# Patient Record
Sex: FEMALE | Race: OTHER | Employment: UNEMPLOYED | URBAN - METROPOLITAN AREA
[De-identification: names, ages, dates, MRNs, and addresses within clinical notes are randomized per-mention and may not be internally consistent; named-entity substitution may affect disease eponyms.]

---

## 2018-07-11 ENCOUNTER — HOSPITAL ENCOUNTER (EMERGENCY)
Age: 33
Discharge: HOME | End: 2018-07-11
Attending: OBSTETRICS & GYNECOLOGY | Admitting: OBSTETRICS & GYNECOLOGY
Payer: MEDICAID

## 2018-07-11 VITALS
WEIGHT: 240 LBS | DIASTOLIC BLOOD PRESSURE: 76 MMHG | SYSTOLIC BLOOD PRESSURE: 128 MMHG | TEMPERATURE: 98.6 F | HEIGHT: 61 IN | RESPIRATION RATE: 18 BRPM | HEART RATE: 92 BPM | BODY MASS INDEX: 45.31 KG/M2

## 2018-07-11 PROBLEM — R10.9 ABDOMINAL PAIN DURING PREGNANCY, THIRD TRIMESTER: Status: ACTIVE | Noted: 2018-07-11

## 2018-07-11 PROBLEM — O26.893 ABDOMINAL PAIN DURING PREGNANCY, THIRD TRIMESTER: Status: ACTIVE | Noted: 2018-07-11

## 2018-07-11 PROCEDURE — 74011250636 HC RX REV CODE- 250/636: Performed by: OBSTETRICS & GYNECOLOGY

## 2018-07-11 PROCEDURE — 99284 EMERGENCY DEPT VISIT MOD MDM: CPT

## 2018-07-11 PROCEDURE — 59025 FETAL NON-STRESS TEST: CPT

## 2018-07-11 PROCEDURE — 96372 THER/PROPH/DIAG INJ SC/IM: CPT

## 2018-07-11 PROCEDURE — 96360 HYDRATION IV INFUSION INIT: CPT

## 2018-07-11 PROCEDURE — 74011250636 HC RX REV CODE- 250/636

## 2018-07-11 RX ORDER — TERBUTALINE SULFATE 1 MG/ML
0.25 INJECTION SUBCUTANEOUS
Status: COMPLETED | OUTPATIENT
Start: 2018-07-11 | End: 2018-07-11

## 2018-07-11 RX ORDER — SODIUM CHLORIDE, SODIUM LACTATE, POTASSIUM CHLORIDE, CALCIUM CHLORIDE 600; 310; 30; 20 MG/100ML; MG/100ML; MG/100ML; MG/100ML
999 INJECTION, SOLUTION INTRAVENOUS CONTINUOUS
Status: DISCONTINUED | OUTPATIENT
Start: 2018-07-11 | End: 2018-07-12 | Stop reason: HOSPADM

## 2018-07-11 RX ORDER — TERBUTALINE SULFATE 1 MG/ML
INJECTION SUBCUTANEOUS
Status: COMPLETED
Start: 2018-07-11 | End: 2018-07-11

## 2018-07-11 RX ADMIN — TERBUTALINE SULFATE 0.25 MG: 1 INJECTION SUBCUTANEOUS at 19:51

## 2018-07-11 RX ADMIN — SODIUM CHLORIDE, SODIUM LACTATE, POTASSIUM CHLORIDE, AND CALCIUM CHLORIDE 999 ML/HR: 600; 310; 30; 20 INJECTION, SOLUTION INTRAVENOUS at 19:50

## 2018-07-11 NOTE — IP AVS SNAPSHOT
Summary of Care Report The Summary of Care report has been created to help improve care coordination. Users with access to [employer] or [address] (Web-based application) may access additional patient information including the Discharge Summary. If you are not currently a [address] user and need more information, please call the number listed below in the [place] [address] section and ask to be connected with Medical Records. Facility Information Name Address Phone [id number] [place] [address] [address] [place] [id number] [phone] Patient Information Patient Name Sex  Bienvenido Chavez ([id number]) Female 1985 Discharge Information Admitting Provider Service Area Unit Cindy Gilman MD / [facility] [address] [place] / [phone] Discharge Provider Discharge Date/Time Discharge Disposition Destination (none) 2018 20:48 (Pending) AHR (none) Patient Language Language ENGLISH [13] Hospital Problems as of 2018  Never Reviewed Class Noted - Resolved Last Modified POA Active Problems Abdominal pain during pregnancy, third trimester  2018 - Present 2018 by Cindy Gilman MD Unknown Entered by Cindy Gilman MD  
  
You are allergic to the following No active allergies Current Discharge Medication List  
  
ASK your doctor about these medications Dose & Instructions Dispensing Information Comments EAMON (PF) 275 mg/1.1 mL Atin Generic drug:  hydroxyprogest(PF)(preg presv) by SubCUTAneous route. Refills:  0  
   
 RIGHT STEP PRENATAL VITAMINS 27 mg iron- 0.8 mg Tab tablet Generic drug:  prenatal vit-iron fumarate-fa Dose:  1 Tab Take 1 Tab by mouth daily. Refills:  0 Follow-up Information Follow up With Details Comments Contact Info Provider Unknown   Patient not available to ask Discharge Instructions Early Stage of Labor at Home: Care Instructions Your Care Instructions If you came to the hospital while in early labor, your doctor may have asked if you want to labor at home until your contractions are stronger. Many women stay at home during early labor. This is often the longest part of the birthing process. It may last up to 2 to 3 days. Contractions are mild to moderate and shorter (about 30 to 45 seconds). You can usually keep talking during them. Contractions may also be irregular, about 5 to 20 minutes apart. They may even stop for a while. It helps to stay as relaxed as you can during this time. You can spend some or all of your early labor at home or anywhere else you may be comfortable. If you live far from the hospital or birthing center, you may want to think about going somewhere nearby so you can get back to the hospital quickly. For some women, there may be benefits to staying home during early labor, such as avoiding medicines or procedures. As labor progresses, you'll shift from early labor to active labor. During this time, contractions get more intense. They occur more often, about every 2 to 3 minutes. They also last longer, about 50 to 70 seconds. You will feel them even when you change positions and walk or move around. It may be hard to tell if you are in active labor. If you aren't sure, call your doctor or midwife. As your labor progresses, check in with your doctor or midwife about when to come back to the hospital or birthing center. You may have special instructions if your water broke or you tested positive for group B strep. Follow-up care is a key part of your treatment and safety. Be sure to make and go to all appointments, and call your doctor if you are having problems. It's also a good idea to know your test results and keep a list of the medicines you take. How can you care for yourself at home? · Get support. Having a support person with you from early labor until after childbirth can have a positive effect on childbirth. · Find distractions. During early labor, you can walk, play cards, watch TV, or listen to music to help take your mind off your contractions. · Ask your partner, labor , or  for a massage. Shoulder and low back massage during contractions may ease your pain. Strong massage of the back muscles (counterpressure) during contractions may help relieve the pain of back labor. Tell your labor  exactly where to push and how hard to push. · Use imagery. This means using your imagination to decrease your pain. For instance, to help manage pain, picture your contractions as waves rolling over you. Picture a peaceful place, such as a beach or mountain stream, to help you relax between contractions. · Change positions during labor. Walking, kneeling, or sitting on a big rubber ball (birth ball) are good options. · Use focused breathing techniques. Breathing in a rhythm can distract you from pain. · Take a warm shower or bath. Warm water may ease pain and stress. When should you call for help? Call 911 anytime you think you may need emergency care. For example, call if: 
  · You passed out (lost consciousness).  
  · You have severe vaginal bleeding.  
  · You have severe pain in your belly or pelvis.  
  · You have had fluid gushing or leaking from your vagina and you know or think the umbilical cord is bulging into your vagina. If this happens, immediately get down on your knees so your rear end (buttocks) is higher than your head. This will decrease the pressure on the cord until help arrives.  
[facility] your doctor now or seek immediate medical care if: 
  · You have new or worse signs of preeclampsia, such as: 
¨ Sudden swelling of your face, hands, or feet. ¨ New vision problems (such as dimness or blurring). ¨ A severe headache.   · You have any vaginal bleeding.  
  · You have belly pain or cramping.  
  · You have a fever.  
  · You have had regular contractions (with or without pain) for an hour. This means that you have 8 or more within 1 hour or 4 or more in 20 minutes after you change your position and drink fluids.  
  · You have a sudden release of fluid from your vagina.  
  · You have low back pain or pelvic pressure that does not go away.  
  · You notice that your baby has stopped moving or is moving much less than normal.  
 Watch closely for changes in your health, and be sure to contact your doctor if you have any problems. Where can you learn more? Go to http://elvis-berlin.info/. Enter X698 in the search box to learn more about \"Early Stage of Labor at Home: Care Instructions. \" Current as of: November 21, 2017 Content Version: 11.7 © 7122-3401 [employer]. Care instructions adapted under license by [employer] (which disclaims liability or warranty for this information). If you have questions about a medical condition or this instruction, always ask your healthcare professional. [address] any warranty or liability for your use of this information. Chart Review Routing History No Routing History on File

## 2018-07-11 NOTE — ED PROVIDER NOTES
Chief Complaint:      27 y.o. female at 35w1d  weeks gestation who is seen for mild abdominal pain. Pelvic pressure, loose bowels. Moved here 3 wks ago from [place], trying to establish [facility]. Taking weekly [place] inj for previous 2nd trimester loss. Due for inj today has it at home. HISTORY:    History   Sexual Activity    Sexual activity: Yes    Partners: Male     No LMP recorded. Patient is pregnant. Social History     Social History    Marital status: SINGLE     Spouse name: N/A    Number of children: N/A    Years of education: N/A     Occupational History    Not on file. Social History Main Topics    Smoking status: Never Smoker    Smokeless tobacco: Not on file    Alcohol use No    Drug use: No    Sexual activity: Yes     Partners: Male     Other Topics Concern    Not on file     Social History Narrative       Past Surgical History:   Procedure Laterality Date    HX GYN          HX OTHER SURGICAL      pyloric stenosis surgery as a child       Past Medical History:   Diagnosis Date    Kidney disease          ROS:  A 12 point review of symptoms negative except for chief complaint as described above. PHYSICAL EXAM:  Blood pressure 128/76, pulse 92, temperature 98.6 °F (37 °C), resp. rate 18, height 5' 1\" (1.549 m), weight 108.9 kg (240 lb). Constitutional: The patient appears well, alert, oriented x 3. Cardiovascular: Heart RRR, no murmurs.    Respiratory: Lungs clear, no respiratory distress  GI: Abdomen soft, nontender, no guarding  No fundal tenderness  Musculoskeletal: no cva tenderness  Upper ext: no edema, reflexes +2  Lower ext: no edema, neg ahmet's, reflexes +2  Skin: no rashes or lesions  Psychiatric:Mood/ Affect: appropriate  Genitourinary: SVE: /ballottable  FHT: 130's cat 1  TOCO: minimal uterine irritability    I personally reviewed pt's medical record including relevant labs and ultrasounds    Assessment/Plan: pelvic pressure/abd pain, no evidence of labor.  Continue w Belén, follow up in office ASAP. Addendum:  Pt began w increasing frequency and intensity of contractions prior to discharge. Cx rechecked after 1 hr, changed to 2/50/-2. Will attempt to stop ctx w IV fluids and terb, allow labor if no improvement. Given terbutaline 0.2 mg SQ and 1 liter LR, ctx much improved. Cx rechecked w no change. Pt comfortable w discharge home, will return if sx return. Questions answered. NA

## 2018-07-11 NOTE — IP AVS SNAPSHOT
[address] [place]  
[phone] Patient: Apoorva Magana MRN: [id number] QQC:3/52/7244 A check angelique indicates which time of day the medication should be taken. My Medications ASK your doctor about these medications Instructions Each Dose to Equal  
 Morning Noon Evening Bedtime EAMON (PF) 275 mg/1.1 mL Atin Generic drug:  hydroxyprogest(PF)(preg presv) Your last dose was: Your next dose is:    
   
   
 by SubCUTAneous route. RIGHT STEP PRENATAL VITAMINS 27 mg iron- 0.8 mg Tab tablet Generic drug:  prenatal vit-iron fumarate-fa Your last dose was: Your next dose is: Take 1 Tab by mouth daily. 1 Tab

## 2018-07-11 NOTE — PROGRESS NOTES
Regular contractions now noted with EFM vs irritability initially. Reactive FHR  Dr Alejandra Birch notified. Orders received to watch pt for one hour and MD will recheck cervix. Plan updated to pt.

## 2018-07-11 NOTE — IP AVS SNAPSHOT
[address] [place] [address] Viraj Martinez  
[phone] Patient: Huber Cruz MRN: [id number] TBG:[id number] About your hospitalization You were admitted on:  N/A You last received care in the:  [facility] You were discharged on:  July 11, 2018 Why you were hospitalized Your primary diagnosis was:  Not on File Your diagnoses also included:  Abdominal Pain During Pregnancy, Third Trimester Follow-up Information Follow up With Details Comments Contact Info Provider Unknown   Patient not available to ask Discharge Orders None A check angelique indicates which time of day the medication should be taken. My Medications ASK your doctor about these medications Instructions Each Dose to Equal  
 Morning Noon Evening Bedtime EAMON (PF) 275 mg/1.1 mL Atin Generic drug:  hydroxyprogest(PF)(preg presv) Your last dose was: Your next dose is:    
   
   
 by SubCUTAneous route. RIGHT STEP PRENATAL VITAMINS 27 mg iron- 0.8 mg Tab tablet Generic drug:  prenatal vit-iron fumarate-fa Your last dose was: Your next dose is: Take 1 Tab by mouth daily. 1 Tab Discharge Instructions Early Stage of Labor at Home: Care Instructions Your Care Instructions If you came to the hospital while in early labor, your doctor may have asked if you want to labor at home until your contractions are stronger. Many women stay at home during early labor. This is often the longest part of the birthing process. It may last up to 2 to 3 days. Contractions are mild to moderate and shorter (about 30 to 45 seconds). You can usually keep talking during them. Contractions may also be irregular, about 5 to 20 minutes apart. They may even stop for a while. It helps to stay as relaxed as you can during this time.  You can spend some or all of your early labor at home or anywhere else you may be comfortable. If you live far from the hospital or birthing center, you may want to think about going somewhere nearby so you can get back to the hospital quickly. For some women, there may be benefits to staying home during early labor, such as avoiding medicines or procedures. As labor progresses, you'll shift from early labor to active labor. During this time, contractions get more intense. They occur more often, about every 2 to 3 minutes. They also last longer, about 50 to 70 seconds. You will feel them even when you change positions and walk or move around. It may be hard to tell if you are in active labor. If you aren't sure, call your doctor or midwife. As your labor progresses, check in with your doctor or midwife about when to come back to the hospital or birthing center. You may have special instructions if your water broke or you tested positive for group B strep. Follow-up care is a key part of your treatment and safety. Be sure to make and go to all appointments, and call your doctor if you are having problems. It's also a good idea to know your test results and keep a list of the medicines you take. How can you care for yourself at home? · Get support. Having a support person with you from early labor until after childbirth can have a positive effect on childbirth. · Find distractions. During early labor, you can walk, play cards, watch TV, or listen to music to help take your mind off your contractions. · Ask your partner, labor , or  for a massage. Shoulder and low back massage during contractions may ease your pain. Strong massage of the back muscles (counterpressure) during contractions may help relieve the pain of back labor. Tell your labor  exactly where to push and how hard to push. · Use imagery. This means using your imagination to decrease your pain. For instance, to help manage pain, picture your contractions as waves rolling over you. Picture a peaceful place, such as a beach or mountain stream, to help you relax between contractions. · Change positions during labor. Walking, kneeling, or sitting on a big rubber ball (birth ball) are good options. · Use focused breathing techniques. Breathing in a rhythm can distract you from pain. · Take a warm shower or bath. Warm water may ease pain and stress. When should you call for help? Call 911 anytime you think you may need emergency care. For example, call if: 
  · You passed out (lost consciousness).  
  · You have severe vaginal bleeding.  
  · You have severe pain in your belly or pelvis.  
  · You have had fluid gushing or leaking from your vagina and you know or think the umbilical cord is bulging into your vagina. If this happens, immediately get down on your knees so your rear end (buttocks) is higher than your head. This will decrease the pressure on the cord until help arrives.  
[facility] your doctor now or seek immediate medical care if: 
  · You have new or worse signs of preeclampsia, such as: 
¨ Sudden swelling of your face, hands, or feet. ¨ New vision problems (such as dimness or blurring). ¨ A severe headache.  
  · You have any vaginal bleeding.  
  · You have belly pain or cramping.  
  · You have a fever.  
  · You have had regular contractions (with or without pain) for an hour. This means that you have 8 or more within 1 hour or 4 or more in 20 minutes after you change your position and drink fluids.  
  · You have a sudden release of fluid from your vagina.  
  · You have low back pain or pelvic pressure that does not go away.  
  · You notice that your baby has stopped moving or is moving much less than normal.  
 Watch closely for changes in your health, and be sure to contact your doctor if you have any problems. Where can you learn more? Go to http://elvis-berlin.info/. Enter J549 in the search box to learn more about \"Early Stage of Labor at Home: Care Instructions. \" Current as of: November 21, 2017 Content Version: 11.7 © 5377-3278 [employer]. Care instructions adapted under license by [employer] (which disclaims liability or warranty for this information). If you have questions about a medical condition or this instruction, always ask your healthcare professional. [address] any warranty or liability for your use of this information. Introducing [facility]! [employer] introduces [employer] patient portal. Now you can access parts of your medical record, email your doctor's office, and request medication refills online. 1. In your internet browser, go to [email or web address] 2. Click on the First Time User? Click Here link in the Sign In box. You will see the New Member Sign Up page. 3. Enter your [employer] Access Code exactly as it appears below. You will not need to use this code after youve completed the sign-up process. If you do not sign up before the expiration date, you must request a new code. · [employer] Access Code: [facility] Expires: 10/9/2018  8:54 PM 
 
4. Enter the last four digits of your Social Security Number (xxxx) and Date of Birth (mm/dd/yyyy) as indicated and click Submit. You will be taken to the next sign-up page. 5. Create a [employer] ID. This will be your [employer] login ID and cannot be changed, so think of one that is secure and easy to remember. 6. Create a [employer] password. You can change your password at any time. 7. Enter your Password Reset Question and Answer. This can be used at a later time if you forget your password. 8. Enter your e-mail address. You will receive e-mail notification when new information is available in [address] 9. Click Sign Up. You can now view and download portions of your medical record. 10. Click the Download Summary menu link to download a portable copy of your medical information. If you have questions, please visit the Frequently Asked Questions section of the [employer] website. Remember, [employer] is NOT to be used for urgent needs. For medical emergencies, dial 911. Now available from your iPhone and Android! Introducing Gustavo Madden As a [employer] patient, I wanted to make you aware of our electronic visit tool called Gustavo Madden. [employer] 24/7 allows you to connect within minutes with a medical provider 24 hours a day, seven days a week via a mobile device or tablet or logging into a secure website from your computer. You can access Gustavo Madden from anywhere in the [place]. A virtual visit might be right for you when you have a simple condition and feel like you just dont want to get out of bed, or cant get away from work for an appointment, when your regular [employer] provider is not available (evenings, weekends or holidays), or when youre out of town and need minor care. Electronic visits cost only $49 and if the [employer] 24/7 provider determines a prescription is needed to treat your condition, one can be electronically transmitted to a nearby pharmacy*. Please take a moment to enroll today if you have not already done so. The enrollment process is free and takes just a few minutes. To enroll, please download the [employer] 24/7 melissa to your tablet or phone, or visit [email or web address] to enroll on your computer. And, as an [address] [place] [address] patient with a [employer] account, the results of your visits will be scanned into your electronic medical record and your primary care provider will be able to view the scanned results.    
We urge you to continue to see your regular [employer] provider for your ongoing medical care. And while your primary care provider may not be the one available when you seek a Gustavo Madden virtual visit, the peace of mind you get from getting a real diagnosis real time can be priceless. For more information on Gustavo Madden, view our Frequently Asked Questions (FAQs) at [email or web address]. Sincerely, 
 
Lizbeth Chen MD 
Chief Medical Officer [employer] *:  certain medications cannot be prescribed via Gustavo Mdaden Providers Seen During Your Hospitalization Provider Specialty Primary office phone Linda Soria MD Obstetrics & Gynecology [phone] Your Primary Care Physician (PCP) Primary Care Physician Office Phone Office Fax UNKNOWN, PROVIDER ** None ** ** None ** You are allergic to the following No active allergies Recent Documentation Height Weight BMI OB Status Smoking Status 1.549 m 108.9 kg 45.35 kg/m2 Pregnant Never Smoker Emergency Contacts Name Discharge Info Relation Home Work [employer]  Other Relative [6] [phone] Patient Belongings The following personal items are in your possession at time of discharge: 
                             
 
  
  
 Please provide this summary of care documentation to your next provider. Signatures-by signing, you are acknowledging that this After Visit Summary has been reviewed with you and you have received a copy. Patient Signature:  ____________________________________________________________ Date:  ____________________________________________________________  
  
Sheela Garner Provider Signature:  ____________________________________________________________ Date:  ____________________________________________________________

## 2018-07-11 NOTE — PROGRESS NOTES
Pt here for abdominal cramping and pressure since 1200. States \"loose bowels\" since 1200. Had intercourse at 0500 am. . Hx of 19 week lose with #5 pregnancy. States her cervix was approx 1.99 3-4 weeks ago. Takes brooklynn injections- scheduled to take this weeks injection tonight. States she is dated with an edc of 18 by an 8 week ultrasound. Pt is a very good historian. [facility] at [email or web address] Pt states she is trying to \"get in with [facility]\", has not been officially accepted yet, and is waiting on the rest of her records to transfer.

## 2018-07-12 NOTE — PROGRESS NOTES
IV dc'd labor precautions given and kick counts given patient given opportunity for questions. Patient and family verbalize understanding of these instructions.

## 2018-07-12 NOTE — DISCHARGE INSTRUCTIONS
Early Stage of Labor at Home: Care Instructions  Your Care Instructions    If you came to the hospital while in early labor, your doctor may have asked if you want to labor at home until your contractions are stronger. Many women stay at home during early labor. This is often the longest part of the birthing process. It may last up to 2 to 3 days. Contractions are mild to moderate and shorter (about 30 to 45 seconds). You can usually keep talking during them. Contractions may also be irregular, about 5 to 20 minutes apart. They may even stop for a while. It helps to stay as relaxed as you can during this time. You can spend some or all of your early labor at home or anywhere else you may be comfortable. If you live far from the hospital or birthing center, you may want to think about going somewhere nearby so you can get back to the hospital quickly. For some women, there may be benefits to staying home during early labor, such as avoiding medicines or procedures. As labor progresses, you'll shift from early labor to active labor. During this time, contractions get more intense. They occur more often, about every 2 to 3 minutes. They also last longer, about 50 to 70 seconds. You will feel them even when you change positions and walk or move around. It may be hard to tell if you are in active labor. If you aren't sure, call your doctor or midwife. As your labor progresses, check in with your doctor or midwife about when to come back to the hospital or birthing center. You may have special instructions if your water broke or you tested positive for group B strep. Follow-up care is a key part of your treatment and safety. Be sure to make and go to all appointments, and call your doctor if you are having problems. It's also a good idea to know your test results and keep a list of the medicines you take. How can you care for yourself at home? · Get support.  Having a support person with you from early labor until after childbirth can have a positive effect on childbirth. · Find distractions. During early labor, you can walk, play cards, watch TV, or listen to music to help take your mind off your contractions. · Ask your partner, labor , or  for a massage. Shoulder and low back massage during contractions may ease your pain. Strong massage of the back muscles (counterpressure) during contractions may help relieve the pain of back labor. Tell your labor  exactly where to push and how hard to push. · Use imagery. This means using your imagination to decrease your pain. For instance, to help manage pain, picture your contractions as waves rolling over you. Picture a peaceful place, such as a beach or mountain stream, to help you relax between contractions. · Change positions during labor. Walking, kneeling, or sitting on a big rubber ball (birth ball) are good options. · Use focused breathing techniques. Breathing in a rhythm can distract you from pain. · Take a warm shower or bath. Warm water may ease pain and stress. When should you call for help? Call 911 anytime you think you may need emergency care. For example, call if:    · You passed out (lost consciousness).     · You have severe vaginal bleeding.     · You have severe pain in your belly or pelvis.     · You have had fluid gushing or leaking from your vagina and you know or think the umbilical cord is bulging into your vagina. If this happens, immediately get down on your knees so your rear end (buttocks) is higher than your head. This will decrease the pressure on the cord until help arrives.   [facility] your doctor now or seek immediate medical care if:    · You have new or worse signs of preeclampsia, such as:  ¨ Sudden swelling of your face, hands, or feet. ¨ New vision problems (such as dimness or blurring).   ¨ A severe headache.     · You have any vaginal bleeding.     · You have belly pain or cramping.     · You have a fever.     · You have had regular contractions (with or without pain) for an hour. This means that you have 8 or more within 1 hour or 4 or more in 20 minutes after you change your position and drink fluids.     · You have a sudden release of fluid from your vagina.     · You have low back pain or pelvic pressure that does not go away.     · You notice that your baby has stopped moving or is moving much less than normal.    Watch closely for changes in your health, and be sure to contact your doctor if you have any problems. Where can you learn more? Go to http://elvis-berlin.info/. Enter J676 in the search box to learn more about \"Early Stage of Labor at Home: Care Instructions. \"  Current as of: November 21, 2017  Content Version: 11.7  © 5612-9257 [employer]. Care instructions adapted under license by [employer] (which disclaims liability or warranty for this information). If you have questions about a medical condition or this instruction, always ask your healthcare professional. [address] any warranty or liability for your use of this information.

## 2018-08-10 ENCOUNTER — HOSPITAL ENCOUNTER (EMERGENCY)
Age: 33
Discharge: HOME | End: 2018-08-10
Attending: OBSTETRICS & GYNECOLOGY | Admitting: OBSTETRICS & GYNECOLOGY
Payer: COMMERCIAL

## 2018-08-10 VITALS
TEMPERATURE: 98.2 F | BODY MASS INDEX: 45.69 KG/M2 | HEART RATE: 85 BPM | DIASTOLIC BLOOD PRESSURE: 81 MMHG | RESPIRATION RATE: 17 BRPM | SYSTOLIC BLOOD PRESSURE: 119 MMHG | HEIGHT: 61 IN | WEIGHT: 242 LBS

## 2018-08-10 PROBLEM — R10.2 PELVIC PRESSURE IN PREGNANCY, ANTEPARTUM, THIRD TRIMESTER: Status: ACTIVE | Noted: 2018-08-10

## 2018-08-10 PROBLEM — O26.893 PELVIC PRESSURE IN PREGNANCY, ANTEPARTUM, THIRD TRIMESTER: Status: ACTIVE | Noted: 2018-08-10

## 2018-08-10 LAB
A1 MICROGLOB PLACENTAL VAG QL: NEGATIVE
AMPHET UR QL SCN: NEGATIVE
APPEARANCE UR: CLEAR
BACTERIA SPEC CULT: NORMAL
BACTERIA URNS QL MICRO: 0 /HPF
BARBITURATES UR QL SCN: NEGATIVE
BASOPHILS # BLD: 0 K/UL
BASOPHILS NFR BLD: 0 % (ref 0–2)
BENZODIAZ UR QL: NEGATIVE
BILIRUB UR QL: NEGATIVE
CANNABINOIDS UR QL SCN: NEGATIVE
COCAINE UR QL SCN: NEGATIVE
COLOR UR: YELLOW
CONTROL LINE PRESENT?: NORMAL
DIFFERENTIAL METHOD BLD: ABNORMAL
EOSINOPHIL # BLD: 0 K/UL
EOSINOPHIL NFR BLD: 1 % (ref 0.5–7.8)
ERYTHROCYTE [DISTWIDTH] IN BLOOD BY AUTOMATED COUNT: 13.2 %
EXPIRATION DATE: NORMAL
GLUCOSE UR STRIP.AUTO-MCNC: NEGATIVE MG/DL
HCT VFR BLD AUTO: 30.2 % (ref 35.8–46.3)
HGB BLD-MCNC: 9.9 G/DL (ref 11.7–15.4)
HGB UR QL STRIP: NEGATIVE
IMM GRANULOCYTES # BLD: 0 K/UL
IMM GRANULOCYTES NFR BLD AUTO: 0 % (ref 0–5)
INTERNAL NEGATIVE CONTROL: NORMAL
KETONES UR QL STRIP.AUTO: NEGATIVE MG/DL
KIT LOT NO.: NORMAL
LEUKOCYTE ESTERASE UR QL STRIP.AUTO: NEGATIVE
LYMPHOCYTES # BLD: 1.6 K/UL
LYMPHOCYTES NFR BLD: 33 % (ref 13–44)
MCH RBC QN AUTO: 29.2 PG (ref 26.1–32.9)
MCHC RBC AUTO-ENTMCNC: 32.8 G/DL (ref 31.4–35)
MCV RBC AUTO: 89.1 FL (ref 79.6–97.8)
METHADONE UR QL: NEGATIVE
MONOCYTES # BLD: 0.4 K/UL
MONOCYTES NFR BLD: 8 % (ref 4–12)
NEUTS SEG # BLD: 2.7 K/UL
NEUTS SEG NFR BLD: 58 % (ref 43–78)
NITRITE UR QL STRIP.AUTO: NEGATIVE
NRBC # BLD: 0 K/UL (ref 0–0.2)
OPIATES UR QL: NEGATIVE
PCP UR QL: NEGATIVE
PH UR STRIP: 6.5 [PH] (ref 5–9)
PLATELET # BLD AUTO: 287 K/UL (ref 150–450)
PMV BLD AUTO: 12.2 FL (ref 9.4–12.3)
PROT UR STRIP-MCNC: NEGATIVE MG/DL
RBC # BLD AUTO: 3.39 M/UL (ref 4.05–5.2)
RPR SER QL: NONREACTIVE
SERVICE CMNT-IMP: NORMAL
SP GR UR REFRACTOMETRY: 1.02 (ref 1–1.02)
UROBILINOGEN UR QL STRIP.AUTO: 1 EU/DL (ref 0.2–1)
WBC # BLD AUTO: 4.7 K/UL (ref 4.3–11.1)

## 2018-08-10 PROCEDURE — 84112 EVAL AMNIOTIC FLUID PROTEIN: CPT | Performed by: OBSTETRICS & GYNECOLOGY

## 2018-08-10 PROCEDURE — 86762 RUBELLA ANTIBODY: CPT

## 2018-08-10 PROCEDURE — 87340 HEPATITIS B SURFACE AG IA: CPT

## 2018-08-10 PROCEDURE — 85025 COMPLETE CBC W/AUTO DIFF WBC: CPT

## 2018-08-10 PROCEDURE — 36415 COLL VENOUS BLD VENIPUNCTURE: CPT

## 2018-08-10 PROCEDURE — 87653 STREP B DNA AMP PROBE: CPT

## 2018-08-10 PROCEDURE — 76815 OB US LIMITED FETUS(S): CPT | Performed by: OBSTETRICS & GYNECOLOGY

## 2018-08-10 PROCEDURE — 99285 EMERGENCY DEPT VISIT HI MDM: CPT

## 2018-08-10 PROCEDURE — 87081 CULTURE SCREEN ONLY: CPT

## 2018-08-10 PROCEDURE — 87389 HIV-1 AG W/HIV-1&-2 AB AG IA: CPT

## 2018-08-10 PROCEDURE — 80307 DRUG TEST PRSMV CHEM ANLYZR: CPT

## 2018-08-10 PROCEDURE — 59025 FETAL NON-STRESS TEST: CPT

## 2018-08-10 PROCEDURE — 81003 URINALYSIS AUTO W/O SCOPE: CPT

## 2018-08-10 PROCEDURE — 86592 SYPHILIS TEST NON-TREP QUAL: CPT

## 2018-08-10 RX ORDER — ACETAMINOPHEN 500 MG
500 TABLET ORAL
COMMUNITY

## 2018-08-10 NOTE — ED PROVIDER NOTES
Chief Complaint: Pelvic pressure and vaginal moistness. 27 y.o. female  at 39w3d  weeks gestation who is seen for several days of pelvic pressure and vaginal moistness. Pt had prenatal care in [place] and has been unable to find an Ob physician since moving to [place]. Pt is s/p  times 5 and she has never had a C section. Pt notes good FM. She denies VB, LOF, uterine ctx, abdominal pain, fever, UTI symptoms, CP, SOB, HA, or scotomata. Pt notes that she did take the 1 hour glucola test in [place] and passed it. HISTORY:    History   Sexual Activity    Sexual activity: Yes    Partners: Male     No LMP recorded. Patient is pregnant. Social History     Social History    Marital status: SINGLE     Spouse name: N/A    Number of children: N/A    Years of education: N/A     Occupational History    Not on file. Social History Main Topics    Smoking status: Never Smoker    Smokeless tobacco: Not on file    Alcohol use No    Drug use: No    Sexual activity: Yes     Partners: Male     Other Topics Concern    Not on file     Social History Narrative       Past Surgical History:   Procedure Laterality Date    HX GYN          HX OTHER SURGICAL      pyloric stenosis surgery as a child       Past Medical History:   Diagnosis Date    Kidney disease          ROS:  An 8 point review of symptoms negative except for chief complaint as described above. PHYSICAL EXAM:  Blood pressure 119/81, pulse 85, temperature 98.2 °F (36.8 °C), resp. rate 17, height 5' 1\" (1.549 m), weight 109.8 kg (242 lb). Constitutional: The patient appears well, alert, oriented x 3. Cardiovascular: Heart RRR, no murmurs.    Respiratory: Lungs clear, no respiratory distress  GI: Abdomen soft, nontender, no guarding  No fundal tenderness  Musculoskeletal: no cva tenderness  Lower ext: no edema, neg ahmet's, reflexes +2  Psychiatric:Mood/ Affect: appropriate  Genitourinary: SVE: 2cm/50%/vtx/-3  FHT: Category 1 with mod variability and + accels; Reactive; no fluid or blood in the vagina  TOCO: no ctx  Abd ultrasound - BRODY - 9.5cm; vtx; active fetus with good FBM  Amnisure is negative    I personally reviewed pt's medical record including relevant labs and ultrasounds    Assessment/Plan:  Will draw all prenatal labs and UDS. Will obtain GBS culture. Pt discharged to home with labor precautions. Pt to be seen by the [facility] physician on call for unassigned pts early next week.

## 2018-08-10 NOTE — IP AVS SNAPSHOT
[address] 
[phone] Patient: Ely Rodriguez MRN: [id number] VHX:4[birth year]31/[birth year]33 A check angelique indicates which time of day the medication should be taken. My Medications ASK your doctor about these medications Instructions Each Dose to Equal  
 Morning Noon Evening Bedtime EAMON (PF) 275 mg/1.1 mL Atin Generic drug:  hydroxyprogest(PF)(preg presv) Your last dose was: Your next dose is:    
   
   
 by SubCUTAneous route. RIGHT STEP PRENATAL VITAMINS 27 mg iron- 0.8 mg Tab tablet Generic drug:  prenatal vit-iron fumarate-fa Your last dose was: Your next dose is: Take 1 Tab by mouth daily. 1 Tab TYLENOL EXTRA STRENGTH 500 mg tablet Generic drug:  acetaminophen Your last dose was: Your next dose is: Take 500 mg by mouth every six (6) hours as needed for Pain. Indications: Pain  500 mg

## 2018-08-10 NOTE — IP AVS SNAPSHOT
Jameson Ego 
 
 
 [address] 
[phone] Patient: Apoorva Magana MRN: [id number] UUZ:[id number]/[id number] About your hospitalization You were admitted on:  N/A You last received care in the:  [facility] You were discharged on:  August 10, 2018 Why you were hospitalized Your primary diagnosis was:  Not on File Your diagnoses also included:  Pelvic Pressure In Pregnancy, Antepartum, Third Trimester Follow-up Information Follow up With Details Comments Contact Info Provider Unknown   Patient not available to ask Discharge Orders None A check angelique indicates which time of day the medication should be taken. My Medications ASK your doctor about these medications Instructions Each Dose to Equal  
 Morning Noon Evening Bedtime EAMON (PF) 275 mg/1.1 mL Atin Generic drug:  hydroxyprogest(PF)(preg presv) Your last dose was: Your next dose is:    
   
   
 by SubCUTAneous route. RIGHT STEP PRENATAL VITAMINS 27 mg iron- 0.8 mg Tab tablet Generic drug:  prenatal vit-iron fumarate-fa Your last dose was: Your next dose is: Take 1 Tab by mouth daily. 1 Tab TYLENOL EXTRA STRENGTH 500 mg tablet Generic drug:  acetaminophen Your last dose was: Your next dose is: Take 500 mg by mouth every six (6) hours as needed for Pain. Indications: Pain 500 mg Discharge Instructions None Introducing [facility]! [employer] introduces [employer] patient portal. Now you can access parts of your medical record, email your doctor's office, and request medication refills online. 1. In your internet browser, go to [email or web address] 2. Click on the First Time User? Click Here link in the Sign In box.  You will see the New Member Sign Up page. 3. Enter your SmartCrowdst Access Code exactly as it appears below. You will not need to use this code after youve completed the sign-up process. If you do not sign up before the expiration date, you must request a new code. · [facility] Access Code: [facility] Expires: 10/9/2018  8:54 PM 
 
4. Enter the last four digits of your Social Security Number (xxxx) and Date of Birth (mm/dd/yyyy) as indicated and click Submit. You will be taken to the next sign-up page. 5. Create a SmartCrowdst ID. This will be your [facility] login ID and cannot be changed, so think of one that is secure and easy to remember. 6. Create a SmartCrowdst password. You can change your password at any time. 7. Enter your Password Reset Question and Answer. This can be used at a later time if you forget your password. 8. Enter your e-mail address. You will receive e-mail notification when new information is available in [address]. 9. Click Sign Up. You can now view and download portions of your medical record. 10. Click the Download Summary menu link to download a portable copy of your medical information. If you have questions, please visit the Frequently Asked Questions section of the [facility] website. Remember, [facility] is NOT to be used for urgent needs. For medical emergencies, dial 911. Now available from your iPhone and Android! Introducing Gustavo Madden As a [employer] patient, I wanted to make you aware of our electronic visit tool called Gustavo Jaseprateek. [employer] 24/7 allows you to connect within minutes with a medical provider 24 hours a day, seven days a week via a mobile device or tablet or logging into a secure website from your computer. You can access Gustavo Madden from anywhere in the [place].  
 
A virtual visit might be right for you when you have a simple condition and feel like you just dont want to get out of bed, or cant get away from work for an appointment, when your regular [facility] provider is not available (evenings, weekends or holidays), or when youre out of town and need minor care. Electronic visits cost only $49 and if the [facility] 24/7 provider determines a prescription is needed to treat your condition, one can be electronically transmitted to a nearby pharmacy*. Please take a moment to enroll today if you have not already done so. The enrollment process is free and takes just a few minutes. To enroll, please download the [employer] 24/7 melissa to your tablet or phone, or visit [email or web address] to enroll on your computer. And, as an [address] [place] [address] patient with a [employer] account, the results of your visits will be scanned into your electronic medical record and your primary care provider will be able to view the scanned results. We urge you to continue to see your regular [facility] provider for your ongoing medical care. And while your primary care provider may not be the one available when you seek a Gustavo Jaseannyfin virtual visit, the peace of mind you get from getting a real diagnosis real time can be priceless. For more information on XRONet, view our Frequently Asked Questions (FAQs) at [email or web address]. Sincerely, 
 
Alexis Soto MD 
Chief Medical Officer [address] *:  certain medications cannot be prescribed via XRONet Unresulted Labs-Please follow up with your PCP about these lab tests Order Current Status CBC WITH AUTOMATED DIFF In process CULTURE, GENITAL GROUP B STREP In process DRUG SCREEN, URINE In process HEPATITIS B SURFACE AG W/REFLEX In process HIV 1/2 AG/AB, 4TH GENERATION,W RFLX CONFIRM In process RPR In process RUBELLA AB, IGG In process URINALYSIS W/ RFLX MICROSCOPIC In process Providers Seen During Your Hospitalization Provider Specialty Primary office phone Gogo Au MD Gynecology [phone] Your Primary Care Physician (PCP) Primary Care Physician Office Phone Office Fax UNKNOWN, PROVIDER ** None ** ** None ** You are allergic to the following No active allergies Recent Documentation Height Weight BMI OB Status Smoking Status 1.549 m 109.8 kg 45.73 kg/m2 Pregnant Never Smoker Emergency Contacts Name Discharge Info Relation Home Work Mobile Oanh Escobar  Other Relative [6] [phone] Patient Belongings The following personal items are in your possession at time of discharge: 
                             
 
  
  
Discharge Instructions Attachments/References PREGNANCY: WEEK 39 (ENGLISH) Patient Handouts Week 39 of Your Pregnancy: Care Instructions Your Care Instructions During these final weeks, you may feel anxious to see your new baby. [place] babies often look different from what you see in pictures or movies. Right after birth, their heads may have a strange shape. Their eyes may be puffy. And their genitals may be swollen. They may also have very dry skin, or red marks on the eyelids, nose, or neck. Still, most parents think their babies are beautiful. Follow-up care is a key part of your treatment and safety. Be sure to make and go to all appointments, and call your doctor if you are having problems. It's also a good idea to know your test results and keep a list of the medicines you take. How can you care for yourself at home? Prepare to breastfeed · If you are breastfeeding, continue to eat healthy foods. · Avoid alcohol, cigarettes, and drugs. This includes prescription and over-the-counter medicines. · You can help prevent sore nipples if you feed your baby in the correct position. Nurses will help you learn to do this. · Your  will need to be fed about every 1½ to 3 hours. Choose the right birth control after your baby is born · Women who are breastfeeding can still get pregnant. Use birth control if you don't want to get pregnant. · Intrauterine devices (IUDs) work for women who want to wait at least 2 years before getting pregnant again. They are safe to use while you are breastfeeding. · Depo-Provera can be used while you are breastfeeding. It is a shot you get every 3 months. · Birth control pills work well. But you need a different kind of pill while you are breastfeeding. And when you start taking these pills, you need to make sure to use another type of birth control until you start your second pack. · Diaphragms, cervical caps, tubal implants, and condoms with spermicide work less well after birth. If you have a diaphragm or cervical cap, you will need to have it refitted. · Tubal ligation (tying your tubes) and vasectomy are both permanent. These are good options if you are sure you are done having children. Where can you learn more? Go to http://elvis-berlin.info/. Enter F454 in the search box to learn more about \"Week 39 of Your Pregnancy: Care Instructions. \" Current as of: November 21, 2017 Content Version: 11.7 © 2415-2843 [employer]ango. Care instructions adapted under license by [employer] (which disclaims liability or warranty for this information). If you have questions about a medical condition or this instruction, always ask your healthcare professional. [address] any warranty or liability for your use of this information. Please provide this summary of care documentation to your next provider. Signatures-by signing, you are acknowledging that this After Visit Summary has been reviewed with you and you have received a copy. Patient Signature:  ____________________________________________________________  Date:  ____________________________________________________________  
  
Wei Police    
    
 Provider Signature:  ____________________________________________________________ Date:  ____________________________________________________________

## 2018-08-10 NOTE — PROGRESS NOTES
1727 Pt. Arrived to [facility] for pelvic pressure. Pt. Moved from [facility] and does not have primary OB. Pt. States she tried to get into [facility] but was unable to get in. Pt. States she is high risk due to shortened cervix. 36 Dr. Bo Starks at bedside for maternal and fetal assessment. FHR tracing reviewed per Dr. Bo Starks. Amnisure collected. Orders received to collect GBS. Bedside ultrasound per Dr. Bo Starks. BRODY 9.5 SVE per Dr. Bo Starks 2/50/-3. Dr. Bo Starks states NST is reacitve and gives orders to remove EFM and toco now. 1856 Amnisure negative. D/C to home orders received. Dr. Bo Starks would like pt to follow up with primary OB on Monday. Dr. Sebastian Arellano is next on the rotations. Message left for [facility]. Face sheet placed in Agatha's office. A good phone number to reach pt. Is [phone]. Labs collected. Social work consult ordered. 1827 I have reviewed discharge instructions with the patient. The patient verbalized understanding.

## 2018-08-10 NOTE — IP AVS SNAPSHOT
Summary of Care Report The Summary of Care report has been created to help improve care coordination. Users with access to [employer] or [address] (Web-based application) may access additional patient information including the Discharge Summary. If you are not currently a [address] user and need more information, please call the number listed below in the [place] [address] section and ask to be connected with Medical Records. Facility Information Name Address Phone [address] [place] [address] [address] [place] [phone] Patient Information Patient Name Sex  Bienvenido Chavez ([id number]) Female 1985 Discharge Information Admitting Provider Service Area Unit Maximiliano Man MD / [address] [facility] [address] [place] / [phone] Discharge Provider Discharge Date/Time Discharge Disposition Destination (none) 8/10/2018 18:26 (Pending) AHR (none) Patient Language Language ENGLISH [13] Hospital Problems as of 8/10/2018  Reviewed: 8/10/2018  5:07 PM by Maximiliano Man MD  
  
  
  
 Class Noted - Resolved Last Modified POA Active Problems Pelvic pressure in pregnancy, antepartum, third trimester  8/10/2018 - Present 8/10/2018 by Maximiliano Man MD Unknown Entered by Maximiliano Man MD  
  
Non-Hospital Problems as of 8/10/2018  Reviewed: 8/10/2018  5:07 PM by Maximiliano Man MD  
  
  
  
 Class Noted - Resolved Last Modified Active Problems Abdominal pain during pregnancy, third trimester  2018 - Present 2018 by Cindy Gilman MD  
  Entered by Cindy Gilman MD  
  
You are allergic to the following No active allergies Current Discharge Medication List  
  
ASK your doctor about these medications Dose & Instructions Dispensing Information Comments EAMON (PF) 275 mg/1.1 mL Atin Generic drug:  hydroxyprogest(PF)(preg presv) by SubCUTAneous route. Refills:  0  
   
 RIGHT STEP PRENATAL VITAMINS 27 mg iron- 0.8 mg Tab tablet Generic drug:  prenatal vit-iron fumarate-fa Dose:  1 Tab Take 1 Tab by mouth daily. Refills:  0  
   
 TYLENOL EXTRA STRENGTH 500 mg tablet Generic drug:  acetaminophen Dose:  500 mg Take 500 mg by mouth every six (6) hours as needed for Pain. Indications: Pain Refills:  0 Follow-up Information Follow up With Details Comments Contact Info Provider Unknown   Patient not available to ask Discharge Instructions None Chart Review Routing History No Routing History on File

## 2018-08-11 LAB — RUBV IGG SERPL IA-ACNC: 19.1 IU/ML

## 2018-08-12 LAB
HBV SURFACE AG SERPL QL IA: NEGATIVE
HIV 1+2 AB+HIV1 P24 AG SERPL QL IA: NON REACTIVE

## 2018-08-14 PROBLEM — O09.299 HISTORY OF PREMATURE RUPTURE OF MEMBRANES IN PREVIOUS PREGNANCY, CURRENTLY PREGNANT: Status: ACTIVE | Noted: 2018-08-14

## 2018-08-14 PROBLEM — Z3A.40 40 WEEKS GESTATION OF PREGNANCY: Status: ACTIVE | Noted: 2018-08-14

## 2018-08-14 PROBLEM — Z87.51 HISTORY OF PRETERM LABOR: Status: ACTIVE | Noted: 2018-08-14

## 2018-08-14 PROBLEM — O09.299 HX OF PREECLAMPSIA, PRIOR PREGNANCY, CURRENTLY PREGNANT: Status: ACTIVE | Noted: 2018-08-14

## 2018-08-14 LAB
BACTERIA SPEC CULT: NORMAL
SERVICE CMNT-IMP: NORMAL

## 2018-08-15 NOTE — PROGRESS NOTES
Patient ID verified. Allergies, medical history, prenatal record and prior to admission medications verified. Pt instructed to be NPO after midnight. Pt instructed to call @ 0500 for time to arrive at hospital, come to entrance C and sign in at the registration desk on the 4th floor. Patient instructed to come to hospital sooner if SROM, labor, or concerning symptoms. Patient verbalized understanding. Questions encouraged and answered.

## 2018-08-16 ENCOUNTER — ANESTHESIA EVENT (OUTPATIENT)
Dept: LABOR AND DELIVERY | Age: 33
DRG: 560 | End: 2018-08-16
Payer: COMMERCIAL

## 2018-08-16 ENCOUNTER — HOSPITAL ENCOUNTER (INPATIENT)
Age: 33
LOS: 1 days | Discharge: HOME | DRG: 560 | End: 2018-08-17
Attending: OBSTETRICS & GYNECOLOGY | Admitting: OBSTETRICS & GYNECOLOGY
Payer: COMMERCIAL

## 2018-08-16 ENCOUNTER — ANESTHESIA (OUTPATIENT)
Dept: LABOR AND DELIVERY | Age: 33
DRG: 560 | End: 2018-08-16
Payer: COMMERCIAL

## 2018-08-16 DIAGNOSIS — Z3A.40 40 WEEKS GESTATION OF PREGNANCY: Primary | ICD-10-CM

## 2018-08-16 PROBLEM — Z37.9 NORMAL LABOR: Status: ACTIVE | Noted: 2018-08-16

## 2018-08-16 LAB
ABO + RH BLD: NORMAL
BASE DEFICIT BLDCOA-SCNC: 3.5 MMOL/L (ref 0–2)
BASE DEFICIT BLDCOV-SCNC: 3.7 MMOL/L (ref 1.9–7.7)
BDY SITE: ABNORMAL
BDY SITE: NORMAL
BLOOD GROUP ANTIBODIES SERPL: NORMAL
ERYTHROCYTE [DISTWIDTH] IN BLOOD BY AUTOMATED COUNT: 13.4 %
HCO3 BLDCOA-SCNC: 22 MMOL/L (ref 22–26)
HCO3 BLDV-SCNC: 21 MMOL/L
HCT VFR BLD AUTO: 32 % (ref 35.8–46.3)
HGB BLD-MCNC: 10.3 G/DL (ref 11.7–15.4)
MCH RBC QN AUTO: 28.8 PG (ref 26.1–32.9)
MCHC RBC AUTO-ENTMCNC: 32.2 G/DL (ref 31.4–35)
MCV RBC AUTO: 89.4 FL (ref 79.6–97.8)
NRBC # BLD: 0 K/UL (ref 0–0.2)
PCO2 BLDCOA: 41 MMHG (ref 33–49)
PCO2 BLDCOV: 36 MMHG (ref 14.1–43.3)
PH BLDCOA: 7.35 [PH] (ref 7.21–7.31)
PH BLDCOV: 7.38 [PH] (ref 7.2–7.44)
PLATELET # BLD AUTO: 315 K/UL (ref 150–450)
PMV BLD AUTO: 12.1 FL (ref 9.4–12.3)
PO2 BLDCOA: 34 MMHG (ref 9–19)
PO2 BLDV: 41 MMHG (ref 30.4–57.2)
RBC # BLD AUTO: 3.58 M/UL (ref 4.05–5.2)
SERVICE CMNT-IMP: ABNORMAL
SERVICE CMNT-IMP: NORMAL
SPECIMEN EXP DATE BLD: NORMAL
WBC # BLD AUTO: 4.5 K/UL (ref 4.3–11.1)

## 2018-08-16 PROCEDURE — 77030007880 HC KT SPN EPDRL BBMI -B: Performed by: ANESTHESIOLOGY

## 2018-08-16 PROCEDURE — A4300 CATH IMPL VASC ACCESS PORTAL: HCPCS | Performed by: ANESTHESIOLOGY

## 2018-08-16 PROCEDURE — 74011250636 HC RX REV CODE- 250/636

## 2018-08-16 PROCEDURE — 75410000003 HC RECOV DEL/VAG/CSECN EA 0.5 HR: Performed by: OBSTETRICS & GYNECOLOGY

## 2018-08-16 PROCEDURE — 74011250636 HC RX REV CODE- 250/636: Performed by: OBSTETRICS & GYNECOLOGY

## 2018-08-16 PROCEDURE — 77030014125 HC TY EPDRL BBMI -B: Performed by: ANESTHESIOLOGY

## 2018-08-16 PROCEDURE — 86901 BLOOD TYPING SEROLOGIC RH(D): CPT

## 2018-08-16 PROCEDURE — 36415 COLL VENOUS BLD VENIPUNCTURE: CPT

## 2018-08-16 PROCEDURE — 76060000078 HC EPIDURAL ANESTHESIA

## 2018-08-16 PROCEDURE — 75410000002 HC LABOR FEE PER 1 HR

## 2018-08-16 PROCEDURE — 3E033VJ INTRODUCTION OF OTHER HORMONE INTO PERIPHERAL VEIN, PERCUTANEOUS APPROACH: ICD-10-PCS | Performed by: OBSTETRICS & GYNECOLOGY

## 2018-08-16 PROCEDURE — 77030009413 HC ELECTRD SCALP COVD -A

## 2018-08-16 PROCEDURE — 82803 BLOOD GASES ANY COMBINATION: CPT

## 2018-08-16 PROCEDURE — 85027 COMPLETE CBC AUTOMATED: CPT

## 2018-08-16 PROCEDURE — 75410000000 HC DELIVERY VAGINAL/SINGLE

## 2018-08-16 PROCEDURE — 74011250637 HC RX REV CODE- 250/637: Performed by: OBSTETRICS & GYNECOLOGY

## 2018-08-16 PROCEDURE — 77030011945 HC CATH URIN INT ST MENT -A

## 2018-08-16 PROCEDURE — 65270000029 HC RM PRIVATE

## 2018-08-16 PROCEDURE — 77030018846 HC SOL IRR STRL H20 ICUM -A

## 2018-08-16 PROCEDURE — 77030011943

## 2018-08-16 PROCEDURE — 74011258636 HC RX REV CODE- 258/636: Performed by: OBSTETRICS & GYNECOLOGY

## 2018-08-16 RX ORDER — IBUPROFEN 800 MG/1
800 TABLET ORAL
Status: DISCONTINUED | OUTPATIENT
Start: 2018-08-16 | End: 2018-08-17 | Stop reason: HOSPADM

## 2018-08-16 RX ORDER — SODIUM CHLORIDE 0.9 % (FLUSH) 0.9 %
5-10 SYRINGE (ML) INJECTION AS NEEDED
Status: DISCONTINUED | OUTPATIENT
Start: 2018-08-16 | End: 2018-08-16

## 2018-08-16 RX ORDER — MINERAL OIL
120 OIL (ML) ORAL
Status: COMPLETED | OUTPATIENT
Start: 2018-08-16 | End: 2018-08-16

## 2018-08-16 RX ORDER — LIDOCAINE HYDROCHLORIDE 20 MG/ML
JELLY TOPICAL
Status: DISCONTINUED | OUTPATIENT
Start: 2018-08-16 | End: 2018-08-16 | Stop reason: HOSPADM

## 2018-08-16 RX ORDER — FENTANYL CITRATE 50 UG/ML
100 INJECTION, SOLUTION INTRAMUSCULAR; INTRAVENOUS ONCE
Status: DISCONTINUED | OUTPATIENT
Start: 2018-08-16 | End: 2018-08-16

## 2018-08-16 RX ORDER — OXYCODONE AND ACETAMINOPHEN 5; 325 MG/1; MG/1
1 TABLET ORAL
Status: DISCONTINUED | OUTPATIENT
Start: 2018-08-16 | End: 2018-08-17 | Stop reason: HOSPADM

## 2018-08-16 RX ORDER — ROPIVACAINE HYDROCHLORIDE 2 MG/ML
INJECTION, SOLUTION EPIDURAL; INFILTRATION; PERINEURAL
Status: DISCONTINUED | OUTPATIENT
Start: 2018-08-16 | End: 2018-08-23 | Stop reason: HOSPADM

## 2018-08-16 RX ORDER — OXYTOCIN/0.9 % SODIUM CHLORIDE 15/250 ML
250 PLASTIC BAG, INJECTION (ML) INTRAVENOUS ONCE
Status: DISCONTINUED | OUTPATIENT
Start: 2018-08-16 | End: 2018-08-16

## 2018-08-16 RX ORDER — LIDOCAINE HYDROCHLORIDE 10 MG/ML
1 INJECTION, SOLUTION EPIDURAL; INFILTRATION; INTRACAUDAL; PERINEURAL
Status: DISCONTINUED | OUTPATIENT
Start: 2018-08-16 | End: 2018-08-16 | Stop reason: HOSPADM

## 2018-08-16 RX ORDER — DIPHENHYDRAMINE HCL 25 MG
25 CAPSULE ORAL
Status: DISCONTINUED | OUTPATIENT
Start: 2018-08-16 | End: 2018-08-17 | Stop reason: HOSPADM

## 2018-08-16 RX ORDER — OXYTOCIN/RINGER'S LACTATE 30/500 ML
.5-2 PLASTIC BAG, INJECTION (ML) INTRAVENOUS
Status: DISCONTINUED | OUTPATIENT
Start: 2018-08-16 | End: 2018-08-16 | Stop reason: HOSPADM

## 2018-08-16 RX ORDER — DEXTROSE, SODIUM CHLORIDE, SODIUM LACTATE, POTASSIUM CHLORIDE, AND CALCIUM CHLORIDE 5; .6; .31; .03; .02 G/100ML; G/100ML; G/100ML; G/100ML; G/100ML
125 INJECTION, SOLUTION INTRAVENOUS CONTINUOUS
Status: DISCONTINUED | OUTPATIENT
Start: 2018-08-16 | End: 2018-08-16

## 2018-08-16 RX ORDER — ONDANSETRON 4 MG/1
4 TABLET, ORALLY DISINTEGRATING ORAL
Status: ACTIVE | OUTPATIENT
Start: 2018-08-16 | End: 2018-08-17

## 2018-08-16 RX ORDER — FENTANYL CITRATE 50 UG/ML
INJECTION, SOLUTION INTRAMUSCULAR; INTRAVENOUS
Status: COMPLETED
Start: 2018-08-16 | End: 2018-08-16

## 2018-08-16 RX ORDER — SIMETHICONE 80 MG
80 TABLET,CHEWABLE ORAL
Status: DISCONTINUED | OUTPATIENT
Start: 2018-08-16 | End: 2018-08-17 | Stop reason: HOSPADM

## 2018-08-16 RX ORDER — FENTANYL CITRATE 50 UG/ML
INJECTION, SOLUTION INTRAMUSCULAR; INTRAVENOUS AS NEEDED
Status: DISCONTINUED | OUTPATIENT
Start: 2018-08-16 | End: 2018-08-23 | Stop reason: HOSPADM

## 2018-08-16 RX ORDER — BUTORPHANOL TARTRATE 1 MG/ML
1 INJECTION INTRAMUSCULAR; INTRAVENOUS
Status: DISCONTINUED | OUTPATIENT
Start: 2018-08-16 | End: 2018-08-16 | Stop reason: HOSPADM

## 2018-08-16 RX ORDER — LIDOCAINE HYDROCHLORIDE 10 MG/ML
1 INJECTION INFILTRATION; PERINEURAL
Status: DISCONTINUED | OUTPATIENT
Start: 2018-08-16 | End: 2018-08-16 | Stop reason: SDUPTHER

## 2018-08-16 RX ORDER — DOCUSATE SODIUM 100 MG/1
100 CAPSULE, LIQUID FILLED ORAL 2 TIMES DAILY
Status: DISCONTINUED | OUTPATIENT
Start: 2018-08-16 | End: 2018-08-17 | Stop reason: HOSPADM

## 2018-08-16 RX ORDER — SODIUM CHLORIDE 0.9 % (FLUSH) 0.9 %
5-10 SYRINGE (ML) INJECTION EVERY 8 HOURS
Status: DISCONTINUED | OUTPATIENT
Start: 2018-08-16 | End: 2018-08-16

## 2018-08-16 RX ADMIN — SODIUM CHLORIDE, SODIUM LACTATE, POTASSIUM CHLORIDE, CALCIUM CHLORIDE, AND DEXTROSE MONOHYDRATE 125 ML/HR: 600; 310; 30; 20; 5 INJECTION, SOLUTION INTRAVENOUS at 07:29

## 2018-08-16 RX ADMIN — ROPIVACAINE HYDROCHLORIDE 8 ML/HR: 2 INJECTION, SOLUTION EPIDURAL; INFILTRATION; PERINEURAL at 08:50

## 2018-08-16 RX ADMIN — FENTANYL CITRATE 85 MCG: 50 INJECTION, SOLUTION INTRAMUSCULAR; INTRAVENOUS at 08:46

## 2018-08-16 RX ADMIN — MINERAL OIL 120 ML: 471.95 OIL ORAL at 13:10

## 2018-08-16 RX ADMIN — FENTANYL CITRATE 15 MCG: 50 INJECTION, SOLUTION INTRAMUSCULAR; INTRAVENOUS at 08:45

## 2018-08-16 RX ADMIN — OXYTOCIN 2 MILLI-UNITS/MIN: 10 INJECTION, SOLUTION INTRAMUSCULAR; INTRAVENOUS at 07:38

## 2018-08-16 NOTE — LACTATION NOTE
Discussed plan of care with patient. Discussed pt's choice of infant feeding method. Feeding options explored, including the importance of exclusive breastfeeding. Pt informed of our breastfeeding support system from from nursing staff and lactation staff during inpatient stay and following discharge. Questions and concerns addressed at this time. Pt confirms breastfeeding is her decision at this time. Kendra Juan

## 2018-08-16 NOTE — H&P
History & Physical    Name: Apoorva Magana MRN: [id number]  SSN: [id number]    YOB: 1985  Age: 27 y.o. Sex: female      Subjective:     Estimated Date of Delivery: 18  OB History    Para Term  AB Living   7 6 3 3  5   SAB TAB Ectopic Molar Multiple Live Births              # Outcome Date GA Lbr Gatito/2nd Weight Sex Delivery Anes PTL Lv   7 Current            6 Term 07/01/15 38w0d  7 lb 1 oz (3.204 kg) M Vag-Spont EPIDURAL AN     5  14 19w0d   M          Complications:  premature rupture of membranes (PPROM) delivered, current hospitalization   4  10/18/08 36w0d  7 lb (3.175 kg) M Vag-Spont EPIDURAL AN N       Complications:  labor   3 Term 10/08/07 38w0d  6 lb 3 oz (2.807 kg) M Vag-Spont None N    2  03 35w0d  5 lb 9 oz (2.523 kg) F Vag-Spont EPIDURAL AN Y       Complications:  labor   1 Term 97 38w0d  7 lb 3 oz (3.26 kg) M Vag-Spont None N       Complications: Preeclampsia          Ms. Jose Moyer is admitted with pregnancy at 40w2d for induction of labor due to favorable cervix at term. Prenatal course was complicated by H/O  delivery and moved at 29 weeks . Please see prenatal records for details. Past Medical History:   Diagnosis Date    Kidney disease     Preeclampsia      Past Surgical History:   Procedure Laterality Date    HX GYN          HX OTHER SURGICAL      pyloric stenosis surgery as a child     Social History     Occupational History    Not on file.      Social History Main Topics    Smoking status: Former Smoker     Quit date:     Smokeless tobacco: Never Used    Alcohol use No    Drug use: No    Sexual activity: Yes     Partners: Male     Birth control/ protection: None     Family History   Problem Relation Age of Onset    Diabetes Mother     Diabetes Sister     Diabetes Maternal Grandmother        No Known Allergies  Prior to Admission medications    Medication Sig Start Date End Date Taking? Authorizing Provider   acetaminophen (TYLENOL EXTRA STRENGTH) 500 mg tablet Take 500 mg by mouth every six (6) hours as needed for Pain. Indications: Pain   Yes Historical Provider   prenatal vit-iron fumarate-fa (RIGHT STEP PRENATAL VITAMINS) 27 mg iron- 0.8 mg tab tablet Take 1 Tab by mouth daily. Yes Historical Provider        Review of Systems: A comprehensive review of systems was negative except for that written in the History of Present Illness. Objective:     Vitals:  Vitals:    08/16/18 0654 08/16/18 0739   BP: 119/80    Pulse: 84    Resp: 16 16   Temp: 98.4 °F (36.9 °C)    SpO2: 99%         Physical Exam:  Patient without distress. Heart: Regular rate and rhythm  Lung: clear to auscultation throughout lung fields, no wheezes, no rales, no rhonchi and normal respiratory effort  Cervical Exam: 4 cm dilated    50% effaced    -2 station    Membranes:  Artificial Rupture of Membranes; Amniotic Fluid: medium amount of clear fluid  Fetal Heart Rate: Reactive          Prenatal Labs:   No results found for: ABORH, RUBELLAEXT, HBSAGEXT, HIVEXT, RPREXT, GONNOEXT, CHLAMEXT, ABORHEXT, RUBELLAEXT, GRBSEXT, HBSAGEXT, HIVEXT, RPREXT, GONNOEXT, CHLAMEXT    Impression/Plan:     Active Problems:    40 weeks gestation of pregnancy (8/14/2018)      Overview: Per patient did genetic testing and was normal.            Transfer care from [place]. See limited OB records scanned in. Normal labor (8/16/2018)         Plan: Admit for induction of labor. Group B Strep negative.     Signed By:  Amadeo Armas MD     August 16, 2018

## 2018-08-16 NOTE — IP AVS SNAPSHOT
[address] [facility] [address] 
[phone] Patient: Fernandez Wilkes MRN: [id number] IVU:[birth year] About your hospitalization You were admitted on:  August 16, 2018 You last received care in the:  [address] [facility] You were discharged on:  August 17, 2018 Why you were hospitalized Your primary diagnosis was:  40 Weeks Gestation Of Pregnancy Your diagnoses also included:  Normal Labor Follow-up Information Follow up With Details Comments Contact Info Mini Colón MD Schedule an appointment as soon as possible for a visit in 2 weeks  [address] [place] [address] [address] [facility] [place] [address] 
[phone] Your Scheduled Appointments Wednesday August 29, 2018  9:15 AM EDT PostPartum 2 week with Mini Colón MD  
[facility] ([address]) [address] [place] [id number]  
[phone] Discharge Orders Procedure Order Date Status Priority Quantity Spec Type Associated Dx CALL YOUR DOCTOR For: Difficulty breathing, headache, or visual disturbances. , Extreme fatigue. , Persistant dizziness or light-headedness. , Persistant nausea and vomiting., Redness, tenderness, or signs of infection. , Severe uncontrolled pain., Te. .. 08/17/18 0957 Normal Routine 1  40 weeks gestation of pregnancy [[id number]] Questions: For:  Difficulty breathing, headache, or visual disturbances. For:  Extreme fatigue. For:  Persistant dizziness or light-headedness. For:  Persistant nausea and vomiting. For:  Redness, tenderness, or signs of infection. For:  Severe uncontrolled pain. For:  Temperature greater than 100.4. ACTIVITY AFTER DISCHARGE Patient should: Restrict driving, Restrict lifting, Restrict sexual activity. Pelvic Rest 08/17/18 0957 Normal Routine 1  40 weeks gestation of pregnancy [[id number]]   Comments:  Pelvic Rest  
 Questions: Patient should:  Restrict driving Patient should:  Restrict lifting Patient should:  Restrict sexual activity. DIET REGULAR No added salt 18 0957 Normal Routine 1  40 weeks gestation of pregnancy [[id number]] Questions: Additional options:  No added salt A check angelique indicates which time of day the medication should be taken. My Medications START taking these medications Instructions Each Dose to Equal  
 Morning Noon Evening Bedtime  
 ibuprofen 800 mg tablet Commonly known as:  MOTRIN Your last dose was: Your next dose is: Take 1 Tab by mouth every six (6) hours as needed. 800 mg CONTINUE taking these medications Instructions Each Dose to Equal  
 Morning Noon Evening Bedtime RIGHT STEP PRENATAL VITAMINS 27 mg iron- 0.8 mg Tab tablet Generic drug:  prenatal vit-iron fumarate-fa Your last dose was: Your next dose is: Take 1 Tab by mouth daily. 1 Tab TYLENOL EXTRA STRENGTH 500 mg tablet Generic drug:  acetaminophen Your last dose was: Your next dose is: Take 500 mg by mouth every six (6) hours as needed for Pain. Indications: Pain 500 mg Where to Get Your Medications Information on where to get these meds will be given to you by the nurse or doctor. ! Ask your nurse or doctor about these medications  
  ibuprofen 800 mg tablet Discharge Instructions After Your Delivery (the Postpartum Period): Care Instructions Your Care Instructions Congratulations on the birth of your baby. Like pregnancy, the  period can be a time of excitement, elena, and exhaustion. You may look at your wondrous little baby and feel happy. You may also be overwhelmed by your new sleep hours and new responsibilities. At first, babies often sleep during the days and are awake at night. They do not have a pattern or routine. They may make sudden gasps, jerk themselves awake, or look like they have crossed eyes. These are all normal, and they may even make you smile. In these first weeks after delivery, try to take good care of yourself. It may take 4 to 6 weeks to feel like yourself again, and possibly longer if you had a  birth. You will likely feel very tired for several weeks. Your days will be full of ups and downs, but lots of elena as well. Follow-up care is a key part of your treatment and safety. Be sure to make and go to all appointments, and call your doctor if you are having problems. It's also a good idea to know your test results and keep a list of the medicines you take. How can you care for yourself at home? Take care of your body after delivery · Use pads instead of tampons for the bloody flow that may last as long as 2 weeks. · Ease cramps with ibuprofen (Advil, Motrin). · Ease soreness of hemorrhoids and the area between your vagina and rectum with ice compresses or witch hazel pads. · Ease constipation by drinking lots of fluid and eating high-fiber foods. Ask your doctor about over-the-counter stool softeners. · Cleanse yourself with a gentle squeeze of warm water from a bottle instead of wiping with toilet paper. · Take a sitz bath in warm water several times a day. · Wear a good nursing bra. Ease sore and swollen breasts with warm, wet washcloths. · If you are not breastfeeding, use ice rather than heat for breast soreness. · Your period may not start for several months if you are breastfeeding. You may bleed more, and longer at first, than you did before you got pregnant. · Wait until you are healed (about 4 to 6 weeks) before you have sexual intercourse. Your doctor will tell you when it is okay to have sex. · Try not to travel with your baby for 5 or 6 weeks.  If you take a long car trip, make frequent stops to walk around and stretch. Avoid exhaustion · Rest every day. Try to nap when your baby naps. · Ask another adult to be with you for a few days after delivery. · Plan for  if you have other children. · Stay flexible so you can eat at odd hours and sleep when you need to. Both you and your baby are making new schedules. · Plan small trips to get out of the house. Change can make you feel less tired. · Ask for help with housework, cooking, and shopping. Remind yourself that your job is to care for your baby. Know about help for postpartum depression · \"Baby blues\" are common for the first 1 to 2 weeks after birth. You may cry or feel sad or irritable for no reason. · Rest whenever you can. Being tired makes it harder to handle your emotions. · Go for walks with your baby. · Talk to your partner, friends, and family about your feelings. · If your symptoms last for more than a few weeks, or if you feel very depressed, ask your doctor for help. · Postpartum depression can be treated. Support groups and counseling can help. Sometimes medicine can also help. Stay healthy · Eat healthy foods so you have more energy, make good breast milk, and lose extra baby pounds. · If you breastfeed, avoid alcohol and drugs. If you quit smoking during pregnancy, try to stay smoke-free. · Start daily exercise after 4 to 6 weeks, but rest when you feel tired. · Learn exercises to tone your belly. Do Kegel exercises to regain strength in your pelvic muscles. You can do these exercises while you stand or sit. ¨ Squeeze the same muscles you would use to stop your urine. Your belly and thighs should not move. ¨ Hold the squeeze for 3 seconds, and then relax for 3 seconds. ¨ Start with 3 seconds. Then add 1 second each week until you are able to squeeze for 10 seconds. ¨ Repeat the exercise 10 to 15 times for each session. Do three or more sessions each day. · Find a class for new mothers and new babies that has an exercise time. · If you had a  birth, give yourself a bit more time before you exercise, and be careful. When should you call for help? Call 911 anytime you think you may need emergency care. For example, call if: 
  · You passed out (lost consciousness).  
 Call your doctor now or seek immediate medical care if: 
  · You have severe vaginal bleeding. This means you are passing blood clots and soaking through a pad each hour for 2 or more hours.  
  · You are dizzy or lightheaded, or you feel like you may faint.  
  · You have a fever.  
  · You have new belly pain, or your pain gets worse.  
 Watch closely for changes in your health, and be sure to contact your doctor if: 
  · Your vaginal bleeding seems to be getting heavier.  
  · You have new or worse vaginal discharge.  
  · You feel sad, anxious, or hopeless for more than a few days.  
  · You do not get better as expected. Where can you learn more? Go to http://elvis-berlin.info/. Enter A461 in the search box to learn more about \"After Your Delivery (the Postpartum Period): Care Instructions. \" Current as of: 2017 Content Version: 11.7 © 8312-4512 [employer]. Care instructions adapted under license by [employer] (which disclaims liability or warranty for this information). If you have questions about a medical condition or this instruction, always ask your healthcare professional. [place] [address] any warranty or liability for your use of this information. Bevvy Announcement We are excited to announce that we are making your provider's discharge notes available to you in Bevvy. You will see these notes when they are completed and signed by the physician that discharged you from your recent hospital stay.   If you have any questions or concerns about any information you see in Poynt, please call the Health Information Department where you were seen or reach out to your Primary Care Provider for more information about your plan of care. Introducing \[facility]\"" & HEALTH SERVICES! Dear Alejandra Platt: Thank you for requesting a Poynt account. Our records indicate that you already have an active Poynt account. You can access your account anytime at [email or web address] Did you know that you can access your hospital and ER discharge instructions at any time in Poynt? You can also review all of your test results from your hospital stay or ER visit. Additional Information If you have questions, please visit the Frequently Asked Questions section of the Poynt website at [email or web address] Remember, Poynt is NOT to be used for urgent needs. For medical emergencies, dial 911. Now available from your iPhone and Android! Introducing Gustavo Madden As a [facility] patient, I wanted to make you aware of our electronic visit tool called Gustavo Madden. [facility] 24/7 allows you to connect within minutes with a medical provider 24 hours a day, seven days a week via a mobile device or tablet or logging into a secure website from your computer. You can access Gustavo Madden from anywhere in the [place]. A virtual visit might be right for you when you have a simple condition and feel like you just dont want to get out of bed, or cant get away from work for an appointment, when your regular [facility] provider is not available (evenings, weekends or holidays), or when youre out of town and need minor care. Electronic visits cost only $49 and if the [facility] 24/7 provider determines a prescription is needed to treat your condition, one can be electronically transmitted to a nearby pharmacy*. Please take a moment to enroll today if you have not already done so.   The enrollment process is free and takes just a few minutes. To enroll, please download the Shareight 24/7 melissa to your tablet or phone, or visit [email or web address] to enroll on your computer. And, as an [address] [place] [address] patient with a [employer] account, the results of your visits will be scanned into your electronic medical record and your primary care provider will be able to view the scanned results. We urge you to continue to see your regular Shareight provider for your ongoing medical care. And while your primary care provider may not be the one available when you seek a [employer] virtual visit, the peace of mind you get from getting a real diagnosis real time can be priceless. For more information on [employer], view our Frequently Asked Questions (FAQs) at [email or web address]. Sincerely, 
 
Alison Corona MD 
Chief Medical Officer Rocky Araceli Matias *:  certain medications cannot be prescribed via Analytics Engines Providers Seen During Your Hospitalization Provider Specialty Primary office phone Gabriella Campbell MD Obstetrics & Gynecology [phone] Your Primary Care Physician (PCP) Primary Care Physician Office Phone Office Fax UNKNOWN, PROVIDER ** None ** ** None ** You are allergic to the following No active allergies Recent Documentation Breastfeeding? OB Status Smoking Status Yes Recent pregnancy Former Smoker Emergency Contacts Name Discharge Info Relation Home Work Mobile Tyrel Laguna  Spouse [3] [phone] Patient Belongings The following personal items are in your possession at time of discharge: 
  Dental Appliances: None  Visual Aid: None      Home Medications: None   Jewelry: Body Piercing, Ring  Clothing: At bedside    Other Valuables: Cell Phone, Hilda Asper Please provide this summary of care documentation to your next provider. Signatures-by signing, you are acknowledging that this After Visit Summary has been reviewed with you and you have received a copy. Patient Signature:  ____________________________________________________________ Date:  ____________________________________________________________  
  
Saumya Moulds Provider Signature:  ____________________________________________________________ Date:  ____________________________________________________________

## 2018-08-16 NOTE — PROGRESS NOTES
efm not tracing as well with pt on her right side. Repositioned to the left side. Dr Angie Nunn into room and placed fse without difficulty. Pt sitting up in a throne position.  8cms

## 2018-08-16 NOTE — PROGRESS NOTES
Iv hepwelled, epidural pulled, tip intact. bandaid applied to insertion site. Pt nursing infant well.

## 2018-08-16 NOTE — ANESTHESIA PROCEDURE NOTES
CSE Block    Start time: 8/16/2018 8:41 AM  End time: 8/16/2018 8:56 AM  Performed by: Tomas Yadav  Authorized by: Trent GÓMEZ     Pre-Procedure  Indications: at surgeon's request and primary anesthetic    preanesthetic checklist: patient identified, risks and benefits discussed, anesthesia consent, site marked, patient being monitored and timeout performed    Timeout Time: 08:39        Procedure:   Patient Position:  Seated  Prep Region:  Lumbar  Prep: chlorhexidine    Location:  L2-3    Epidural Needle:   Needle Type:  Tuohy  Needle Gauge:  18 G  Injection Technique:  Loss of resistance using saline  Attempts:  1    Spinal Needle:   Needle Type:  Quincke  Needle Gauge:  25 G    Catheter:   Catheter Type:  Open end  Catheter Size:  20 G  Catheter at Skin Depth (cm):  5  Depth in Epidural Space (cm):  5  Events: no blood with aspiration, no cerebrospinal fluid with aspiration, no paresthesia and negative aspiration test    Test Dose:  Lidocaine 1.5% w/ epi and negative    Assessment:   Catheter Secured:  Tegaderm and tape  Insertion:  Uncomplicated  Patient tolerance:  Patient tolerated the procedure well with no immediate complications

## 2018-08-16 NOTE — ANESTHESIA PREPROCEDURE EVALUATION
Anesthetic History   No history of anesthetic complications            Review of Systems / Medical History  Patient summary reviewed and pertinent labs reviewed    Pulmonary  Within defined limits                 Neuro/Psych   Within defined limits           Cardiovascular  Within defined limits                Exercise tolerance: >4 METS     GI/Hepatic/Renal     GERD: well controlled           Endo/Other        Morbid obesity     Other Findings              Physical Exam    Airway  Mallampati: II  TM Distance: 4 - 6 cm  Neck ROM: normal range of motion   Mouth opening: Normal     Cardiovascular  Regular rate and rhythm,  S1 and S2 normal,  no murmur, click, rub, or gallop             Dental         Pulmonary  Breath sounds clear to auscultation               Abdominal         Other Findings            Anesthetic Plan    ASA: 3  Anesthesia type: CSE      Post-op pain plan if not by surgeon: intrathecal opiates, indwelling epidural catheter and epidural opioid      Anesthetic plan and risks discussed with: Patient and Spouse

## 2018-08-16 NOTE — LACTATION NOTE

## 2018-08-16 NOTE — PROGRESS NOTES
Pt feeling pressure. Ant lip. Placed on the peanut. Comfortable with epidural for pain. Dr Cassidy Stafford notified.

## 2018-08-16 NOTE — PROGRESS NOTES
1310-variable with contraction. C/c/+2  1315- in and out cath for 100 mls, concentrated urine. Less juve than the previous cath. 1323- dr Lala Saxena called for delivery, variable with fhr.   1326- nicu called for delivery, dr Lala Saxena at  for delivery  1331- , baby boy  1334-placenta, pitocin infusing per protocol  1340- pericare/pad change, fundus firm.

## 2018-08-16 NOTE — LACTATION NOTE
This note was copied from a baby's chart. Lactation visit with 6th time experienced mom, breastfeeding going well so far and infant currently in cross cradle on right in rhythmic suck. Did not have to assist mom with latch. Reviewed first 24 hours including feeding on demand based on feeding cues, wet and dirty requirements and signs of a good latch, verbalized understanding. Lactation to follow up tomorrow.

## 2018-08-16 NOTE — PROGRESS NOTES
SBAR IN Report: Mother    Verbal report received from JOHN Neal on this patient, who is now being transferred from L&D for routine progression of care. The patient is not wearing a green \"Anesthesia-Duramorph\" band. Report consisted of patient's Situation, Background, Assessment and Recommendations (SBAR).  ID bands were compared with the identification form, and verified with the patient and transferring nurse. Information from the SBAR and the [place] Report was reviewed with the transferring nurse; opportunity for questions and clarification provided.

## 2018-08-16 NOTE — PROGRESS NOTES
SBAR OUT Report: Mother    Verbal report given to sanjiv moses RN (full name & credentials) on this patient, who is now being transferred to  (unit) for routine progression of care. The patient is not wearing a green \"Anesthesia-Duramorph\" band. Report consisted of patient's Situation, Background, Assessment and Recommendations (SBAR).  ID bands were compared with the identification form, and verified with the patient and receiving nurse. Information from the SBAR, Procedure Summary, Intake/Output, MAR and Recent Results and the [place] Report was reviewed with the receiving nurse; opportunity for questions and clarification provided.

## 2018-08-16 NOTE — L&D DELIVERY NOTE
Delivery Summary    Patient: Chirag Lagos MRN: [id number]  SSN: [id number]    YOB: 1985  Age: 27 y.o. Sex: female        Labor Events:    Labor: No    Rupture Date: 2018    Rupture Time: 8:05 AM    Rupture Type AROM    Amniotic Fluid Volume:      Amniotic Fluid Description: Clear  None    Induction: Oxytocin        Augmentation: AROM    Labor Complications: None     Additional Complications:        Cervical Ripening:       None      Delivery Events:  Episiotomy: None    Laceration(s): Right periurethral;Left periurethral;Other (comment)      Repaired: None     Number of Repair Packets:      Suture Type and Size:         Estimated Blood Loss (ml): 200        Information for the patient's :  Yasmeen Hung [[id number]]     Delivery Summary - Baby    Delivery Date: 2018   Delivery Time: 1:31 PM   Delivery Type: Vaginal, Spontaneous Delivery  Sex:  male  Gestational Age: 41w4d  Delivery Clinician: Suraj Wei  Living?: Living   Delivery Location: L&D 426           APGARS  One minute Five minutes Ten minutes   Skin Color: 0    1       Heart Rate: 2   2         Reflex Irritability: 2   2         Muscle Tone: 2   2       Respiration: 2   2         Total: 8   9           Presentation: Vertex  Position: Right Occiput Anterior  Resuscitation Method:  Tactile Stimulation;Suctioning-bulb     Meconium Stained: None    Cord Information: 3 Vessels   Complications: None  Cord Blood Sent?:  Yes    Blood Gases Sent?:  Yes    Placenta:  Date/Time:   1:34 PM  Removal: Spontaneous      Appearance: Normal;Intact     [place] Measurements:  Birth Weight: 7 lb 13.2 oz (3.55 kg)    Birth Length:     Head Circumference:       Chest Circumference:      Abdominal Girth:       Other Providers:   Conception Didier KENYON;GARCÍA JUSTICE;JERRY MENESES;MONCHO HUSSEIN JR;JOSUE CASTRO;PAMELA CID;CIRO MASSEY;DEANA RICHARD;NEAL LOVE Obstetrician;Primary Nurse;Primary  Nurse; Anesthesiologist;Crna;Scrub Tech;Charge Nurse;Respiratory Therapist;Neonatologist           Cord Blood Results:  Information for the patient's :  Leonidas Millan [[id number]]   No results found for: ABORH, PCTABR, PCTDIG, BILI, ABORHEXT, ABORH    Information for the patient's :  Leonidas Millan [[id number]]   No results found for: APH, APCO2, APO2, AHCO3, ABEC, ABDC, O2ST, SITE, RSCOM, PHI, PCO2I, PO2I, HCO3I, SO2I, IBD     Information for the patient's :  Leonidas Millan [[id number]]   No results found for: EPHV, PCO2V, PO2V, HCO3V, O2STV, EBDV

## 2018-08-16 NOTE — PROGRESS NOTES
Labor Progress Note  Patient seen, fetal heart rate and contraction pattern evaluated, patient examined. Patient Vitals for the past 1 hrs:   BP Pulse   08/16/18 1042 119/74 76   08/16/18 1027 113/69 70       Physical Exam:  Cervical Exam:  8 cm dilated    100% effaced    -1 station    Membranes:  Artificial Rupture of Membranes;  Amniotic Fluid: medium amount of clear fluid  Uterine Activity: Frequency: Every 2-3 minutes  Fetal Heart Rate: reassuring    Assessment/Plan:  Reassuring fetal status, Continue plan for vaginal delivery

## 2018-08-16 NOTE — PROGRESS NOTES
Repositioned to right side and peanut reapplied following sve and in and out cath  In/out for 100 mls concentrated urine.    Pericare/pad change  6-7cms/-1  Pitocin increased

## 2018-08-16 NOTE — PROGRESS NOTES
Into room with dr Mendes Sinks for arom. 3-4cms. Clear fluid.  Pt would like an epidural.   sbar from Vannesa Dasilva, [address]

## 2018-08-16 NOTE — PROGRESS NOTES
Pt presented for scheduled induction  Pt states she had a phone interview with [address] [place] [address] RN and the information that was obtained has not changed. POC reviewed. IV started, Consents witnessed. Lab work drawn, sent to lab.

## 2018-08-17 VITALS
DIASTOLIC BLOOD PRESSURE: 63 MMHG | HEART RATE: 68 BPM | TEMPERATURE: 98.4 F | RESPIRATION RATE: 17 BRPM | OXYGEN SATURATION: 97 % | SYSTOLIC BLOOD PRESSURE: 112 MMHG

## 2018-08-17 PROCEDURE — 74011250637 HC RX REV CODE- 250/637: Performed by: OBSTETRICS & GYNECOLOGY

## 2018-08-17 RX ORDER — IBUPROFEN 800 MG/1
800 TABLET ORAL
Qty: 60 TAB | Refills: 0 | Status: SHIPPED
Start: 2018-08-17

## 2018-08-17 RX ADMIN — IBUPROFEN 800 MG: 800 TABLET ORAL at 00:48

## 2018-08-17 NOTE — DISCHARGE INSTRUCTIONS
----- Message from Myles Ha sent at 3/15/2017  6:57 PM CDT -----  Contact: self  Retuning a missed call stating that she can not make it in at 11:30 due to employer she stated that her request was put in to leave early to make the 2pm appt pt can be reached at [phone]   After Your Delivery (the Postpartum Period): Care Instructions  Your Care Instructions    Congratulations on the birth of your baby. Like pregnancy, the  period can be a time of excitement, elena, and exhaustion. You may look at your wondrous little baby and feel happy. You may also be overwhelmed by your new sleep hours and new responsibilities. At first, babies often sleep during the days and are awake at night. They do not have a pattern or routine. They may make sudden gasps, jerk themselves awake, or look like they have crossed eyes. These are all normal, and they may even make you smile. In these first weeks after delivery, try to take good care of yourself. It may take 4 to 6 weeks to feel like yourself again, and possibly longer if you had a  birth. You will likely feel very tired for several weeks. Your days will be full of ups and downs, but lots of elena as well. Follow-up care is a key part of your treatment and safety. Be sure to make and go to all appointments, and call your doctor if you are having problems. It's also a good idea to know your test results and keep a list of the medicines you take. How can you care for yourself at home? Take care of your body after delivery  · Use pads instead of tampons for the bloody flow that may last as long as 2 weeks. · Ease cramps with ibuprofen (Advil, Motrin). · Ease soreness of hemorrhoids and the area between your vagina and rectum with ice compresses or witch hazel pads. · Ease constipation by drinking lots of fluid and eating high-fiber foods. Ask your doctor about over-the-counter stool softeners. · Cleanse yourself with a gentle squeeze of warm water from a bottle instead of wiping with toilet paper. · Take a sitz bath in warm water several times a day. · Wear a good nursing bra. Ease sore and swollen breasts with warm, wet washcloths. · If you are not breastfeeding, use ice rather than heat for breast soreness.   · Your period may not start for several months if you are breastfeeding. You may bleed more, and longer at first, than you did before you got pregnant. · Wait until you are healed (about 4 to 6 weeks) before you have sexual intercourse. Your doctor will tell you when it is okay to have sex. · Try not to travel with your baby for 5 or 6 weeks. If you take a long car trip, make frequent stops to walk around and stretch. Avoid exhaustion  · Rest every day. Try to nap when your baby naps. · Ask another adult to be with you for a few days after delivery. · Plan for  if you have other children. · Stay flexible so you can eat at odd hours and sleep when you need to. Both you and your baby are making new schedules. · Plan small trips to get out of the house. Change can make you feel less tired. · Ask for help with housework, cooking, and shopping. Remind yourself that your job is to care for your baby. Know about help for postpartum depression  · \"Baby blues\" are common for the first 1 to 2 weeks after birth. You may cry or feel sad or irritable for no reason. · Rest whenever you can. Being tired makes it harder to handle your emotions. · Go for walks with your baby. · Talk to your partner, friends, and family about your feelings. · If your symptoms last for more than a few weeks, or if you feel very depressed, ask your doctor for help. · Postpartum depression can be treated. Support groups and counseling can help. Sometimes medicine can also help. Stay healthy  · Eat healthy foods so you have more energy, make good breast milk, and lose extra baby pounds. · If you breastfeed, avoid alcohol and drugs. If you quit smoking during pregnancy, try to stay smoke-free. · Start daily exercise after 4 to 6 weeks, but rest when you feel tired. · Learn exercises to tone your belly. Do Kegel exercises to regain strength in your pelvic muscles. You can do these exercises while you stand or sit.   ¨ Squeeze the same muscles you would use to stop your urine. Your belly and thighs should not move. ¨ Hold the squeeze for 3 seconds, and then relax for 3 seconds. ¨ Start with 3 seconds. Then add 1 second each week until you are able to squeeze for 10 seconds. ¨ Repeat the exercise 10 to 15 times for each session. Do three or more sessions each day. · Find a class for new mothers and new babies that has an exercise time. · If you had a  birth, give yourself a bit more time before you exercise, and be careful. When should you call for help? Call 911 anytime you think you may need emergency care. For example, call if:    · You passed out (lost consciousness).    Call your doctor now or seek immediate medical care if:    · You have severe vaginal bleeding. This means you are passing blood clots and soaking through a pad each hour for 2 or more hours.     · You are dizzy or lightheaded, or you feel like you may faint.     · You have a fever.     · You have new belly pain, or your pain gets worse.    Watch closely for changes in your health, and be sure to contact your doctor if:    · Your vaginal bleeding seems to be getting heavier.     · You have new or worse vaginal discharge.     · You feel sad, anxious, or hopeless for more than a few days.     · You do not get better as expected. Where can you learn more? Go to http://elvis-berlin.info/. Enter A461 in the search box to learn more about \"After Your Delivery (the Postpartum Period): Care Instructions. \"  Current as of: 2017  Content Version: 11.7  © 8166-4358 [employer], [employer]. Care instructions adapted under license by [employer] (which disclaims liability or warranty for this information). If you have questions about a medical condition or this instruction, always ask your healthcare professional. [address] any warranty or liability for your use of this information.

## 2018-08-17 NOTE — DISCHARGE SUMMARY
Obstetrical Discharge Summary     Name: Celestina Swift MRN: [id number]  SSN: [id number]    YOB: 1985  Age: 27 y.o. Sex: female      Allergies: Review of patient's allergies indicates no known allergies. Admit Date: 2018    Discharge Date: 2018     Admitting Physician: Jyotsna Mims MD     Attending Physician: Jyotsna Mims MD     * Admission Diagnoses: induction;Normal labor;40 weeks gestation of pregnancy    * Discharge Diagnoses:   Information for the patient's :  Blaze Lopez [[id number]]   Delivery of a 7 lb 13.2 oz (3.55 kg) male infant via Vaginal, Spontaneous Delivery on 2018 at 1:31 PM  by . Apgars were 8 and 9. Additional Diagnoses:   Hospital Problems as of 2018  Date Reviewed: 2018          Codes Class Noted - Resolved POA    Normal labor ICD-10-CM: O80, Z37.9  ICD-9-CM: 786  2018 - Present Unknown        * (Principal)40 weeks gestation of pregnancy ICD-10-CM: Z3A.40  ICD-9-CM: V22.2  2018 - Present Yes    Overview Addendum 2018  1:19 PM by Virgie Hdez     Per patient did genetic testing and was normal.    Transfer care from [place]. See limited OB records scanned in. Lab Results   Component Value Date/Time    ABO/Rh(D) A POSITIVE 2018 07:22 AM      There is no immunization history on file for this patient. * Procedures: vaginal delivery  * No surgery found *           * Discharge Condition: good    [facility] Course: Normal hospital course following the delivery. * Disposition: Home    Discharge Medications:   Current Discharge Medication List      START taking these medications    Details   ibuprofen (MOTRIN) 800 mg tablet Take 1 Tab by mouth every six (6) hours as needed.   Qty: 60 Tab, Refills: 0    Associated Diagnoses: 40 weeks gestation of pregnancy         CONTINUE these medications which have NOT CHANGED    Details   acetaminophen (TYLENOL EXTRA STRENGTH) 500 mg tablet Take 500 mg by mouth every six (6) hours as needed for Pain. Indications: Pain      prenatal vit-iron fumarate-fa (RIGHT STEP PRENATAL VITAMINS) 27 mg iron- 0.8 mg tab tablet Take 1 Tab by mouth daily. * Follow-up Care/Patient Instructions:   Activity: No sex for 6 weeks, No driving while on analgesics and No heavy lifting for 4 weeks  Diet: Regular Diet  Wound Care: Keep wound clean and dry    Follow-up Information     Follow up With Details Comments Contact Info    Provider Unknown   Patient not available to ask             Signed By:  Amadeo Armas MD     August 17, 2018

## 2018-08-17 NOTE — PROGRESS NOTES
Patient discharged after education completed and questions answered. Discharge instructions signed by patient, and copy provided to patient. Prescriptions given to patient. Escorted to private vehicle. Patient in stable condition at discharge.

## 2018-08-17 NOTE — PROGRESS NOTES
Assessment complete. Fundus firm and bleeding small. IV removed with cath tip intact. Pt requested to take shower. Set pt up with towels and family member in room to assist. No other needs at this time.

## 2018-08-17 NOTE — LACTATION NOTE

## 2018-08-17 NOTE — PROGRESS NOTES
Post-Partum Day Number 1 Progress/Discharge Note    Patient doing well post-partum without significant complaint. Voiding without difficulty, normal lochia, positive flatus. Vitals:  Patient Vitals for the past 8 hrs:   BP Temp Pulse Resp SpO2   / 0700 112/63 98.4 °F (36.9 °C) 68 17 97 %     Temp (24hrs), Av.1 °F (36.7 °C), Min:97.8 °F (36.6 °C), Max:98.4 °F (36.9 °C)      Vital signs stable, afebrile. Exam:  Patient without distress. Abdomen soft, fundus firm at level of umbilicus, non tender               Lower extremities are negative for swelling, cords or tenderness. Lab/Data Review: All lab results for the last 24 hours reviewed. Assessment and Plan:  Patient appears to be having uncomplicated post-partum course. Continue routine perineal care and maternal education. Plan discharge for today with follow up in our office in 6 weeks.

## 2018-08-17 NOTE — PROGRESS NOTES
Report received from Linda Flowers RN, and care assumed. Bedside report completed. Pt denies complaints at present.

## 2018-08-17 NOTE — LACTATION NOTE
This note was copied from a baby's chart. In to see mom and infant for early discharge request. Mom states she just finished feeding baby, baby content and sleepy in mom's arms. Reviewed discharge education and how to manage period of engorgement. Mom confident, no further questions or needs at this time.

## 2018-08-17 NOTE — PROGRESS NOTES
Chart reviewed - no needs identified.  made introduction to family and provided informational packet on  mood disorder education/resources. Family receptive to receiving information and denied any additional needs from . Family has this 's contact information should any needs/questions arise.     Janeth Jensen, [address]

## 2018-08-26 NOTE — ANESTHESIA POSTPROCEDURE EVALUATION
Post-Anesthesia Evaluation and Assessment    Patient: Maame Willson MRN: [id number]  SSN: [id number]    YOB: 1985  Age: 27 y.o. Sex: female       Cardiovascular Function/Vital Signs  There were no vitals taken for this visit. Patient is status post CSE anesthesia for * No procedures listed *. Nausea/Vomiting: None    Postoperative hydration reviewed and adequate. Pain:      Managed    Neurological Status: At baseline    Mental Status and Level of Consciousness: Arousable    Pulmonary Status:       Adequate oxygenation and airway patent    Complications related to anesthesia: None    Post-anesthesia assessment completed.  No concerns    Signed By: Kelly Rodriguez MD     August 26, 2018